# Patient Record
Sex: MALE | Race: WHITE
[De-identification: names, ages, dates, MRNs, and addresses within clinical notes are randomized per-mention and may not be internally consistent; named-entity substitution may affect disease eponyms.]

---

## 2018-12-01 ENCOUNTER — HOSPITAL ENCOUNTER (EMERGENCY)
Dept: HOSPITAL 44 - ED | Age: 58
Discharge: HOME | End: 2018-12-01
Payer: COMMERCIAL

## 2018-12-01 VITALS — DIASTOLIC BLOOD PRESSURE: 79 MMHG | SYSTOLIC BLOOD PRESSURE: 128 MMHG

## 2018-12-01 DIAGNOSIS — S61.211A: Primary | ICD-10-CM
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
## 2018-12-01 PROCEDURE — 12001 RPR S/N/AX/GEN/TRNK 2.5CM/<: CPT

## 2018-12-01 PROCEDURE — 90715 TDAP VACCINE 7 YRS/> IM: CPT

## 2018-12-01 PROCEDURE — 99282 EMERGENCY DEPT VISIT SF MDM: CPT

## 2018-12-01 PROCEDURE — 90471 IMMUNIZATION ADMIN: CPT

## 2018-12-01 NOTE — ED PHYSICIAN DOCUMENTATION
General Adult





- HISTORIAN


Historian: patient





- HPI


Stated Complaint: hand lac


Chief Complaint: Laceration/Recheck/Suture


Onset: minutes (30)


Timing: still present


Severity: mild


Further Comments: yes (He was cutting open his mattress and he cut his hand with

the knife. He is not UTD on tetanus . No numbness. or decreased ROM)


Last known Well Code/Unknown Code: Unknown





- ROS


CONST: no problems





- PAST HX


Past History: none


Immunizations: tetanus


Allergies/Adverse Reactions: 


                                    Allergies











Allergy/AdvReac Type Severity Reaction Status Date / Time


 


No Known Allergies Allergy   Verified 12/01/18 10:41














Home Medications: 


                                Ambulatory Orders











 Medication  Instructions  Recorded


 


Atorvastatin Calcium 10 mg PO D 12/01/18














- SOCIAL HX


Smoking History: non-smoker


Alcohol Use: none


Drug Use: none





- FAMILY HX


Family History: No





- VITAL SIGNS


Vital Signs: 


                                   Vital Signs











Temp Pulse Resp BP Pulse Ox


 


 98.0 F   64   16   128/79   98 


 


 12/01/18 11:06  12/01/18 11:06  12/01/18 11:06  12/01/18 11:06  12/01/18 11:06














- REVIEWED ASSESSMENTS


Nursing Assessment  Reviewed: Yes


Vitals Reviewed: Yes





Procedures


Wound Location: upper extremity (left hand 2nd digit )


Wound's Depth, Shape: superficial, flap


Wound Explored: no foreign body removed


Anesthesia: 1% Lidocaine (5 cc )


Suture Size/Type: 4:0


Number of Sutures: 5


Layer Closure?: No





ED Results Lab/Radiology





- Orders


Orders: 


                                    ED Orders











 Category Date Time Status


 


 Apply/change dressing NOW Care  12/01/18 10:38 Active


 


 Cleanse with NS and Chlorhexid 1T Care  12/01/18 10:15 Active


 


 Triple Antibiotic Ointment 1T Care  12/01/18 10:38 Active


 


 Diph,Pertuss(Acell),Tet Vac/Pf [Adacel] Med  12/01/18 10:29 Discontinued





 0.5 ml IM .ONCE ONE   


 


 Lidocaine 1% 5ml(IM or SUTURE) [Xylocaine] Med  12/01/18 10:37 Discontinued





 50 mg .ROUTE .STK-MED ONE   


 


 Lidocaine 1% 5ml(IM or SUTURE) [Xylocaine] Med  12/01/18 10:38 Discontinued





 50 mg IJ NOW ONE   














General Adult Physical Exam





- PHYSICAL EXAM


GENERAL APPEARANCE: no distress


EENT: eye inspection normal


NECK: normal inspection


RESPIRATORY: no resp distress, chest non-tender, breath sounds normal


CVS: reg rate & rhythm


ABDOMEN: soft, no distension


SKIN: warm/dry, other (left hand 2nd digit lac approx 2 cm with a flap / Cap 

refill + FROM + Pulses + )


EXTREMITIES: non-tender


NEURO: oriented X3





Discharge


Clincal Impression: 


Laceration of left hand


Qualifiers:


 Encounter type: initial encounter Foreign body presence: without foreign body 

Qualified Code(s): S61.412A - Laceration without foreign body of left hand, 

initial encounter





Referrals: 


Primary Doctor,No [Primary Care Provider] - 2 Days


Comments: 





1. Keep area clean and dry


2. Sutures removed in 10days


3. Notify PCP of any s/sx of infection - redness swelling or drainage


4. Return to ER for any concerns 


Condition: Stable


Disposition: 01 HOME, SELF-CARE


Decision to Admit: NO


Date of Decison to Admit: 12/01/18


Decision Time: 11:01